# Patient Record
Sex: MALE | Race: WHITE | NOT HISPANIC OR LATINO
[De-identification: names, ages, dates, MRNs, and addresses within clinical notes are randomized per-mention and may not be internally consistent; named-entity substitution may affect disease eponyms.]

---

## 2021-11-19 PROBLEM — Z00.00 ENCOUNTER FOR PREVENTIVE HEALTH EXAMINATION: Status: ACTIVE | Noted: 2021-11-19

## 2021-11-22 ENCOUNTER — APPOINTMENT (OUTPATIENT)
Dept: RADIATION ONCOLOGY | Facility: CLINIC | Age: 71
End: 2021-11-22
Payer: MEDICARE

## 2021-11-22 VITALS
SYSTOLIC BLOOD PRESSURE: 116 MMHG | OXYGEN SATURATION: 98 % | RESPIRATION RATE: 18 BRPM | TEMPERATURE: 98 F | DIASTOLIC BLOOD PRESSURE: 70 MMHG | HEART RATE: 84 BPM

## 2021-11-22 DIAGNOSIS — Z87.39 PERSONAL HISTORY OF OTHER DISEASES OF THE MUSCULOSKELETAL SYSTEM AND CONNECTIVE TISSUE: ICD-10-CM

## 2021-11-22 DIAGNOSIS — G20 PARKINSON'S DISEASE: ICD-10-CM

## 2021-11-22 DIAGNOSIS — Z80.51 FAMILY HISTORY OF MALIGNANT NEOPLASM OF KIDNEY: ICD-10-CM

## 2021-11-22 DIAGNOSIS — Z86.73 PERSONAL HISTORY OF TRANSIENT ISCHEMIC ATTACK (TIA), AND CEREBRAL INFARCTION W/OUT RESIDUAL DEFICITS: ICD-10-CM

## 2021-11-22 DIAGNOSIS — Z87.891 PERSONAL HISTORY OF NICOTINE DEPENDENCE: ICD-10-CM

## 2021-11-22 PROCEDURE — 99204 OFFICE O/P NEW MOD 45 MIN: CPT | Mod: 25

## 2021-11-22 RX ORDER — ASPIRIN 81 MG
81 TABLET, DELAYED RELEASE (ENTERIC COATED) ORAL
Refills: 0 | Status: ACTIVE | COMMUNITY

## 2021-11-22 RX ORDER — CARBIDOPA AND LEVODOPA 25; 250 MG/1; MG/1
25-250 TABLET ORAL
Refills: 0 | Status: ACTIVE | COMMUNITY

## 2021-11-22 RX ORDER — ADHESIVE TAPE 3"X 2.3 YD
50 MCG TAPE, NON-MEDICATED TOPICAL
Refills: 0 | Status: ACTIVE | COMMUNITY

## 2021-11-22 RX ORDER — ALLOPURINOL 100 MG/1
100 TABLET ORAL
Refills: 0 | Status: ACTIVE | COMMUNITY

## 2021-11-22 RX ORDER — PRAMIPEXOLE DIHYDROCHLORIDE 0.5 MG/1
0.5 TABLET ORAL
Refills: 0 | Status: ACTIVE | COMMUNITY

## 2021-11-22 RX ORDER — ESCITALOPRAM OXALATE 10 MG/1
10 TABLET ORAL
Refills: 0 | Status: ACTIVE | COMMUNITY

## 2021-11-22 RX ORDER — METOPROLOL TARTRATE 50 MG/1
50 TABLET, FILM COATED ORAL
Refills: 0 | Status: ACTIVE | COMMUNITY

## 2021-11-24 NOTE — HISTORY OF PRESENT ILLNESS
[FreeTextEntry1] : Mr. 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 \par \par Mr. Lemos underwent routine lab work that demonstrated a PSA of 8.6 ng/ml. He was referred to Dr. Wright ([facility]) and underwent further work-up. TRUS guided biopsy was performed on 9/23/21 and pathology revealed prostatic adenocarcinoma, [place] 3+4+7 disease, with 7/8 cores were positive bilaterally. Specifically, the right mid core biopsy was negative. \par \par CT  waw IC (10/1/21) revealed moderate to severe cystitis, an atrophic kidney and a small sclerotic focus in L1. Bone scan was performed (10/1/21) revealing no bony metastatic disease. Mr. Lemos reports he had a radiation consultation for CyberKnife performed by Dr. Villavicencio and was deemed not to be an appropriate candidate. He was seen in consultation by Dr. Sheehan for a second opinion . He was started on ADT on 10/7/21 and has follow-up scheduled with Dr. Sheehan in January. \par \par Of note, he has a history of Parkinson's disease fpr the past 18 years and is under the care of Dr. Johnson ([facility]). As a result of the Parkinsons, he has a suprapubic catheter.  He is a past smoker for 17 years 1 ppd and quit 33 years ago. He has a family history inclusive of his brother, niece and nephew, hereditary kidney cancer. Mr. Lemos and his wife present today for a consultation to discuss radiation therapy options.

## 2021-11-24 NOTE — VITALS
[Maximal Pain Intensity: 0/10] : 0/10 [Least Pain Intensity: 0/10] : 0/10 [80: Normal activity with effort; some signs or symptoms of disease.] : 80: Normal activity with effort; some signs or symptoms of disease.  [Date: ____________] : Patient's last distress assessment performed on [unfilled]. [0 - No Distress] : Distress Level: 0 [Referred Patient  to social work for follow-up] : Patient was referred to social work for follow-up [FreeTextEntry7] : Transportation

## 2021-11-24 NOTE — LETTER CLOSING
[Consult Closing:] : Thank you for allowing me to participate in the care of this patient.  If you have any questions, please do not hesitate to contact me. [Sincerely yours,] : Sincerely yours, [FreeTextEntry3] : Kecia Payton MD\par

## 2021-11-24 NOTE — DISEASE MANAGEMENT
[Clinical] : TNM Stage: c [IIA] : IIA [FreeTextEntry4] : Adenocarcinoma of the prostate, PSA 8.6ng/ml, GS 3+4 [TTNM] : 1c [NTNM] : 0 [MTNM] : 0 [de-identified] : prostate

## 2021-11-29 ENCOUNTER — RESULT REVIEW (OUTPATIENT)
Age: 71
End: 2021-11-29

## 2021-12-06 ENCOUNTER — NON-APPOINTMENT (OUTPATIENT)
Age: 71
End: 2021-12-06

## 2021-12-13 ENCOUNTER — NON-APPOINTMENT (OUTPATIENT)
Age: 71
End: 2021-12-13

## 2021-12-21 ENCOUNTER — NON-APPOINTMENT (OUTPATIENT)
Age: 71
End: 2021-12-21

## 2021-12-23 NOTE — DISEASE MANAGEMENT
[Clinical] : TNM Stage: c [IIA] : IIA [FreeTextEntry4] : Adenocarcinoma of the prostate, PSA 8.6ng/ml, GS 3+4 [TTNM] : 1c [NTNM] : 0 [MTNM] : 0 [de-identified] : 750 cGy [de-identified] : 7000 cGy [de-identified] : prostate

## 2021-12-23 NOTE — HISTORY OF PRESENT ILLNESS
[FreeTextEntry1] : Mr. Lemos is present for OTV today. He offer no new symptoms or concerns at this time. He reports he is beginning to be able to feel the urge of urination but has suprapubic catheter in place. He reports he is tolerating treatment well.

## 2021-12-23 NOTE — PHYSICAL EXAM
[Normal] : well developed, well nourished, in no acute distress [de-identified] : suprapubic catheter present

## 2021-12-28 ENCOUNTER — RESULT REVIEW (OUTPATIENT)
Age: 71
End: 2021-12-28

## 2021-12-28 ENCOUNTER — NON-APPOINTMENT (OUTPATIENT)
Age: 71
End: 2021-12-28

## 2021-12-28 DIAGNOSIS — N39.0 INFECTION AND INFLAMMATORY REACTION DUE TO NEPHROSTOMY CATHETER, SUBSEQUENT ENCOUNTER: ICD-10-CM

## 2021-12-28 DIAGNOSIS — T83.512D INFECTION AND INFLAMMATORY REACTION DUE TO NEPHROSTOMY CATHETER, SUBSEQUENT ENCOUNTER: ICD-10-CM

## 2021-12-28 RX ORDER — SULFAMETHOXAZOLE AND TRIMETHOPRIM 800; 160 MG/1; MG/1
800-160 TABLET ORAL TWICE DAILY
Qty: 6 | Refills: 2 | Status: ACTIVE | COMMUNITY
Start: 2021-12-28 | End: 1900-01-01

## 2021-12-29 NOTE — DISEASE MANAGEMENT
[Clinical] : TNM Stage: c [IIA] : IIA [FreeTextEntry4] : Adenocarcinoma of the prostate, PSA 8.6ng/ml, GS 3+4 [TTNM] : 1c [NTNM] : 0 [MTNM] : 0 [de-identified] : 1750 cGy [de-identified] : 7000 cGy [de-identified] : prostate

## 2021-12-29 NOTE — PHYSICAL EXAM
[Normal] : oriented to person, place and time, the affect was normal, the mood was normal and not anxious [de-identified] : suprapubic catheter present

## 2021-12-29 NOTE — HISTORY OF PRESENT ILLNESS
[FreeTextEntry1] : Mr. Lemos is status post fraction 7 / 28 of radiation to the prostate/SV/nodes. Reports some thick mucous from suprapubic catheter, but cleared up when increased water intake. Sending him for u/a, c&s.  States having catheter changed next week. He reports an increase in fatigue, and is finding he has to take a nap in the afternoons.

## 2021-12-29 NOTE — ADDENDUM
[FreeTextEntry1] : UA C&S demonstrated a UTI, patient was notified of the results and started on DS Bactrim.

## 2022-01-04 ENCOUNTER — NON-APPOINTMENT (OUTPATIENT)
Age: 72
End: 2022-01-04

## 2022-01-04 VITALS
HEART RATE: 91 BPM | RESPIRATION RATE: 18 BRPM | DIASTOLIC BLOOD PRESSURE: 74 MMHG | SYSTOLIC BLOOD PRESSURE: 104 MMHG | OXYGEN SATURATION: 95 %

## 2022-01-05 NOTE — HISTORY OF PRESENT ILLNESS
[FreeTextEntry1] : Mr. Lemos is present for OTV. Diagnosed with a UTI last week, Mr. Lemos completed his course of antibiotics. He denies any new urinary or bowel symptoms at this time. He is tolerating radiation treatment well. He reports he had a quiet but nice holiday season. Overall, doing well.

## 2022-01-05 NOTE — DISEASE MANAGEMENT
[Clinical] : TNM Stage: c [IIA] : IIA [FreeTextEntry4] : Adenocarcinoma of the prostate, PSA 8.6ng/ml, GS 3+4 [TTNM] : 1c [NTNM] : 0 [MTNM] : 0 [de-identified] : 8800 cGy [de-identified] : 7000 cGy [de-identified] : prostate

## 2022-01-05 NOTE — PHYSICAL EXAM
[Normal] : oriented to person, place and time, the affect was normal, the mood was normal and not anxious [de-identified] : suprapubic catheter present

## 2022-01-10 VITALS
HEART RATE: 99 BPM | DIASTOLIC BLOOD PRESSURE: 80 MMHG | SYSTOLIC BLOOD PRESSURE: 148 MMHG | OXYGEN SATURATION: 97 % | RESPIRATION RATE: 18 BRPM | TEMPERATURE: 98 F

## 2022-01-10 NOTE — HISTORY OF PRESENT ILLNESS
[FreeTextEntry1] : Mr. Lemos is present for OTV. He is status post fraction 14 / 28 of radiation to the prostate/SV. He offers complaints of foul smelling urine from his catheter. Patient recently completed a course of DS Bactrim for a UTI.  UA/CNS ordered. He has follow-up with Dr. Sheehan on Thursday. Otherwise, no new symptoms.

## 2022-01-10 NOTE — DISEASE MANAGEMENT
[Clinical] : TNM Stage: c [IIA] : IIA [FreeTextEntry4] : Adenocarcinoma of the prostate, PSA 8.6ng/ml, GS 3+4 [TTNM] : 1c [NTNM] : 0 [MTNM] : 0 [de-identified] : 3500 cGy [de-identified] : prostate  [de-identified] : 7000 cGy

## 2022-01-10 NOTE — PHYSICAL EXAM
[Normal] : oriented to person, place and time, the affect was normal, the mood was normal and not anxious [de-identified] : suprapubic catheter present

## 2022-01-10 NOTE — REVIEW OF SYSTEMS
[Urinary Incontinence: Grade 0] : Urinary Incontinence: Grade 0  [Urinary Retention: Grade 0] : Urinary Retention: Grade 0 [Urinary Tract Pain: Grade 0] : Urinary Tract Pain: Grade 0 [Urinary Urgency: Grade 0] : Urinary Urgency: Grade 0 [Urinary Frequency: Grade 0] : Urinary Frequency: Grade 0

## 2022-01-19 ENCOUNTER — NON-APPOINTMENT (OUTPATIENT)
Age: 72
End: 2022-01-19

## 2022-01-19 VITALS
HEART RATE: 87 BPM | OXYGEN SATURATION: 95 % | TEMPERATURE: 98.6 F | RESPIRATION RATE: 18 BRPM | DIASTOLIC BLOOD PRESSURE: 76 MMHG | SYSTOLIC BLOOD PRESSURE: 110 MMHG

## 2022-01-21 NOTE — DISEASE MANAGEMENT
[Clinical] : TNM Stage: c [IIA] : IIA [FreeTextEntry4] : Adenocarcinoma of the prostate, PSA 8.6ng/ml, GS 3+4 [TTNM] : 1c [NTNM] : 0 [MTNM] : 0 [de-identified] : 5250 cGy [de-identified] : 7000 cGy [de-identified] : prostate

## 2022-01-21 NOTE — REVIEW OF SYSTEMS
[Fatigue: Grade 1 - Fatigue relieved by rest] : Fatigue: Grade 1 - Fatigue relieved by rest [Urinary Frequency: Grade 1 - Present] : Urinary Frequency: Grade 1 - Present

## 2022-01-21 NOTE — HISTORY OF PRESENT ILLNESS
[FreeTextEntry1] : Mr. Lemos is status post fraction 21 / 28 of radiation to the prostate/SV. He completed a course of DS Bactrim for a UTI in December. Recently with complaints of foul smelling / cloudy urine and recent UA/CNS 1/14/22 was negative. He is otherwise tolerating treatment well. He denies any bowel symptoms at this time. He reports grade 1 fatigue, relieved by rest. \par

## 2022-01-25 ENCOUNTER — NON-APPOINTMENT (OUTPATIENT)
Age: 72
End: 2022-01-25

## 2022-01-25 VITALS
OXYGEN SATURATION: 96 % | HEART RATE: 88 BPM | RESPIRATION RATE: 18 BRPM | SYSTOLIC BLOOD PRESSURE: 130 MMHG | DIASTOLIC BLOOD PRESSURE: 63 MMHG

## 2022-01-26 NOTE — DISEASE MANAGEMENT
[Clinical] : TNM Stage: c [IIA] : IIA [FreeTextEntry4] : Adenocarcinoma of the prostate, PSA 8.6ng/ml, GS 3+4 [TTNM] : 1c [NTNM] : 0 [MTNM] : 0 [de-identified] : 0360 cGy [de-identified] : 7000 cGy [de-identified] : prostate

## 2022-01-26 NOTE — HISTORY OF PRESENT ILLNESS
[FreeTextEntry1] : Mr. Lemos is present for OTV and is status post fraction 25 / 28 of radiation. He reports improved urinary symptoms. He denies any bowel changes. He has grade 1 fatigue relieved by rest. He is scheduled to complete radiation on Friday. Overall, tolerating treatment well.

## 2022-02-23 ENCOUNTER — APPOINTMENT (OUTPATIENT)
Dept: RADIATION ONCOLOGY | Facility: CLINIC | Age: 72
End: 2022-02-23

## 2022-03-09 ENCOUNTER — APPOINTMENT (OUTPATIENT)
Dept: RADIATION ONCOLOGY | Facility: CLINIC | Age: 72
End: 2022-03-09

## 2022-03-09 VITALS
OXYGEN SATURATION: 98 % | SYSTOLIC BLOOD PRESSURE: 99 MMHG | DIASTOLIC BLOOD PRESSURE: 55 MMHG | HEART RATE: 88 BPM | RESPIRATION RATE: 18 BRPM

## 2022-03-09 PROCEDURE — 99024 POSTOP FOLLOW-UP VISIT: CPT

## 2022-03-09 NOTE — HISTORY OF PRESENT ILLNESS
[FreeTextEntry1] : Mr. Lemos is a 71 year old male, diagnosed with Stage IIA, [id number], unfavorable intermediate risk adenocarcinoma of the prostate, s/p adjuvant radiation therapy to the prostate. He is present for his post treatment evaluation. \par \par Mr. Lemos underwent routine lab work that demonstrated a PSA of 8.6 ng/ml. He was referred to Dr. Wright ([facility]) and underwent further work-up. TRUS guided biopsy was performed on 9/23/21 and pathology revealed prostatic adenocarcinoma, [place] 3+4+7 disease, with 7/8 cores were positive bilaterally. Specifically, the right mid core biopsy was negative. \par \par CT  waw IC (10/1/21) revealed moderate to severe cystitis, an atrophic kidney and a small sclerotic focus in L1. Bone scan was performed (10/1/21) revealing no bony metastatic disease. Mr. Lemos reports he had a radiation consultation for CyberKnife performed by Dr. Villavicencio and was deemed not to be an appropriate candidate. He was seen in consultation by Dr. Sheehan for a second opinion . He was started on ADT on 10/7/21. \par \par Of note, he has a history of Parkinson's disease for the past 18 years and is under the care of Dr. Johnson ([facility]). As a result of the Parkinsons, he has a suprapubic catheter. \par \par Mr. Lemos is present today with his wife. He received adjuvant radiation therapy to the prostate to 70 Gy, completed on 1/28/22. He tolerated treatment well with 1 episode of UTI, while on treatment. He reports cloudy, foul smelling urine today time 1 week. e reports he was doing self catherization since tube seemed to be clogged. He had his suprapubic catheter replaced in [place] [facility] in February after dislodgment at home. He reports visiting RN changes and flushes tube once per month. He denies any bowel changes at this time, however suffers with constipation and is on Miralax. He reports worsening involuntary movements related to his Parkinson's since completing radiation therapy. He had follow-up with Dr. Johnson, who he stated it should improve with time. He is scheduled for follow-up with Dr. Sheehan in April. Mr. Lemos is present today for his post treatment evaluation.

## 2022-03-09 NOTE — REVIEW OF SYSTEMS
[Negative] : Heme/Lymph [Skin Hyperpigmentation: Grade 0] : Skin Hyperpigmentation: Grade 0 [Dermatitis Radiation: Grade 0] : Dermatitis Radiation: Grade 0 [FreeTextEntry8] : "cloudy, foul smelling urine"; suprapubic catheter in place [FreeTextEntry4] : foul smelling, cloudy

## 2022-03-09 NOTE — PHYSICAL EXAM
[Normal] : the sclera and conjunctiva were normal, pupils were equal in size, round, reactive to light and extraocular movements were intact. [de-identified] : s [FreeTextEntry1] : deferred [de-identified] : suprapubic catheter present

## 2022-03-09 NOTE — DISEASE MANAGEMENT
[Clinical] : TNM Stage: c [IIA] : IIA [FreeTextEntry4] : Adenocarcinoma of the prostate, PSA 8.6ng/ml, GS 3+4 [TTNM] : 1c [NTNM] : 0 [MTNM] : 0 [de-identified] : prostate

## 2022-06-23 ENCOUNTER — APPOINTMENT (OUTPATIENT)
Dept: RADIATION ONCOLOGY | Facility: CLINIC | Age: 72
End: 2022-06-23
Payer: MEDICARE

## 2022-06-23 VITALS
HEART RATE: 68 BPM | HEIGHT: 68 IN | OXYGEN SATURATION: 98 % | SYSTOLIC BLOOD PRESSURE: 116 MMHG | TEMPERATURE: 97.1 F | RESPIRATION RATE: 18 BRPM | DIASTOLIC BLOOD PRESSURE: 68 MMHG

## 2022-06-23 PROCEDURE — 99214 OFFICE O/P EST MOD 30 MIN: CPT

## 2022-06-23 RX ORDER — CEFUROXIME AXETIL 250 MG/1
250 TABLET ORAL
Qty: 14 | Refills: 0 | Status: ACTIVE | COMMUNITY
Start: 2022-02-06

## 2022-06-23 RX ORDER — METOPROLOL SUCCINATE 50 MG/1
50 TABLET, EXTENDED RELEASE ORAL
Qty: 90 | Refills: 0 | Status: ACTIVE | COMMUNITY
Start: 2022-01-10

## 2022-06-23 RX ORDER — FAMOTIDINE 40 MG/1
40 TABLET, FILM COATED ORAL
Qty: 30 | Refills: 0 | Status: ACTIVE | COMMUNITY
Start: 2022-03-13

## 2022-06-23 RX ORDER — COLCHICINE 0.6 MG/1
0.6 TABLET ORAL
Qty: 4 | Refills: 0 | Status: ACTIVE | COMMUNITY
Start: 2022-02-24

## 2022-06-23 RX ORDER — NYSTATIN 100000 [USP'U]/G
100000 POWDER TOPICAL
Qty: 30 | Refills: 0 | Status: ACTIVE | COMMUNITY
Start: 2022-01-13

## 2022-06-23 RX ORDER — NITROFURANTOIN (MONOHYDRATE/MACROCRYSTALS) 25; 75 MG/1; MG/1
100 CAPSULE ORAL
Qty: 60 | Refills: 0 | Status: ACTIVE | COMMUNITY
Start: 2022-03-24

## 2022-07-05 ENCOUNTER — NON-APPOINTMENT (OUTPATIENT)
Age: 72
End: 2022-07-05

## 2022-10-20 ENCOUNTER — APPOINTMENT (OUTPATIENT)
Dept: RADIATION ONCOLOGY | Facility: CLINIC | Age: 72
End: 2022-10-20

## 2022-10-20 PROCEDURE — 99214 OFFICE O/P EST MOD 30 MIN: CPT

## 2022-10-25 NOTE — HISTORY OF PRESENT ILLNESS
[FreeTextEntry1] : Mr. Lemos is a 71 year old male, diagnosed with Stage IIA, [id number], unfavorable intermediate risk adenocarcinoma of the prostate, s/p  radiation therapy to the prostate. He is present for his post treatment evaluation. \par \par Mr. Lemos underwent routine lab work that demonstrated a PSA of 8.6 ng/ml. He was referred to Dr. Wright ([facility]) and underwent further work-up. TRUS guided biopsy was performed on 9/23/21 and pathology revealed prostatic adenocarcinoma, [place] 3+4+7 disease, with 7/8 cores were positive bilaterally. Specifically, the right mid core biopsy was negative. \par \par CT  waw IC (10/1/21) revealed moderate to severe cystitis, an atrophic kidney and a small sclerotic focus in L1. Bone scan was performed (10/1/21) revealing no bony metastatic disease.  He was started on ADT on 10/7/21. \par \par Of note, he has a history of Parkinson's disease for the past 18 years and is under the care of Dr. Johnson ([facility]). As a result of the Parkinsons, he has a suprapubic catheter. \par \par Mr. Lemos received definitive radiation therapy to the prostate to 70 Gy, completed on 1/28/22. He reports no new urinary and bowel symptoms. He had follow-up with Dr. Sheehan in April and received his last hormone shot at that time. PSA 0.32 ng/ml (3/9/22). Mr. Lemos presents today for routine follow-up, he is doing well.

## 2022-10-25 NOTE — DISEASE MANAGEMENT
[Clinical] : TNM Stage: c [IIA] : IIA [FreeTextEntry4] : Adenocarcinoma of the prostate, PSA 8.6ng/ml, GS 3+4 [TTNM] : 1c [NTNM] : 0 [MTNM] : 0 [de-identified] : prostate

## 2022-10-25 NOTE — HISTORY OF PRESENT ILLNESS
[FreeTextEntry1] : Mr. Lemos is a 72 year old male, diagnosed with Stage IIA, [id number], unfavorable intermediate risk adenocarcinoma of the prostate, s/p radiation therapy to the prostate. \par \par Mr. Lemos underwent routine lab work that demonstrated a PSA of 8.6 ng/ml. He was referred to Dr. Wright ([facility]) and underwent further work-up. TRUS guided biopsy was performed on 9/23/21 and pathology revealed prostatic adenocarcinoma, [place] 3+4+7 disease, with 7/8 cores were positive bilaterally. Specifically, the right mid core biopsy was negative. \par \par CT  waw IC (10/1/21) revealed moderate to severe cystitis, an atrophic kidney and a small sclerotic focus in L1. Bone scan was performed (10/1/21) revealing no bony metastatic disease. He was started on ADT on 10/7/21. \par \par Of note, Mr. Lemos has a history of Parkinson's disease for the past 18 years and is under the care of Dr. Johnson ([facility]). As a result of the Parkinsons, he has a suprapubic catheter. \par \par Mr. Lemos received definitive radiation therapy to the prostate to 70 Gy, completed on 1/28/22. He had follow-up with Dr. Sheehan in April and received his last hormone shot. He reports no issues with his suprapubic catheter and urination. \par \par PSA \par 0.12 ng/ml (6/23/22) \par 0.32 ng/ml (3/9/22).

## 2022-10-25 NOTE — PHYSICAL EXAM
[Normal] : normal heart rate and rhythm, normal S1 and S2, and no murmurs present [FreeTextEntry1] : deferred [de-identified] : suprapubic catheter present

## 2022-10-25 NOTE — DISEASE MANAGEMENT
[Clinical] : TNM Stage: c [IIA] : IIA [FreeTextEntry4] : Adenocarcinoma of the prostate, PSA 8.6ng/ml, GS 3+4 [TTNM] : 1c [NTNM] : 0 [MTNM] : 0 [de-identified] : prostate

## 2022-10-25 NOTE — PHYSICAL EXAM
[Normal] : oriented to person, place and time, the affect was normal, the mood was normal and not anxious [FreeTextEntry1] : deferred [de-identified] : suprapubic catheter present

## 2022-10-27 ENCOUNTER — NON-APPOINTMENT (OUTPATIENT)
Age: 72
End: 2022-10-27

## 2023-02-16 ENCOUNTER — APPOINTMENT (OUTPATIENT)
Dept: RADIATION ONCOLOGY | Facility: CLINIC | Age: 73
End: 2023-02-16
Payer: MEDICARE

## 2023-02-16 PROCEDURE — 99214 OFFICE O/P EST MOD 30 MIN: CPT

## 2023-02-20 NOTE — HISTORY OF PRESENT ILLNESS
[FreeTextEntry1] : Mr. Lemos is a 72 year old male, diagnosed with Stage IIA, [id number], unfavorable intermediate risk adenocarcinoma of the prostate, s/p radiation therapy to the prostate. \par \par Mr. Lemos underwent routine lab work that demonstrated a PSA of 8.6 ng/ml. He was referred to Dr. Wright ([facility]) and underwent further work-up. TRUS guided biopsy was performed on 9/23/21 and pathology revealed prostatic adenocarcinoma, [place] 3+4+7 disease, with 7/8 cores were positive bilaterally. Specifically, the right mid core biopsy was negative. \par \par CT  waw IC (10/1/21) revealed moderate to severe cystitis, an atrophic kidney and a small sclerotic focus in L1. Bone scan was performed (10/1/21) revealing no bony metastatic disease. He was started on ADT on 10/7/21. \par \par Of note, Mr. Lemos has a history of Parkinson's disease for the past 18 years and is under the care of Dr. Johnson ([facility]). As a result of the Parkinsons, he has a suprapubic catheter. \par \par Mr. Lemos received definitive radiation therapy to the prostate to 70 Gy, completed on 1/28/22. He had follow-up with Dr. Sheehan in December. He completed ADT in 2022. He reports no new symptoms with his urination/suprapubic catheter. He denies any bowel symptoms at this time. He is doing well overall. \par \par PSA \par 0.06 ng/ml (12/2022)\par 0.07 ng/ml (10/20/22)\par 0.12 ng/ml (6/23/22) \par 0.32 ng/ml (3/9/22)

## 2023-02-20 NOTE — DISEASE MANAGEMENT
[Clinical] : TNM Stage: c [IIA] : IIA [FreeTextEntry4] : Adenocarcinoma of the prostate, PSA 8.6ng/ml, GS 3+4 [TTNM] : 1c [NTNM] : 0 [MTNM] : 0 [de-identified] : prostate

## 2023-02-20 NOTE — PHYSICAL EXAM
[Normal] : oriented to person, place and time, the affect was normal, the mood was normal and not anxious [FreeTextEntry1] : deferred [de-identified] : suprapubic catheter present

## 2023-08-02 ENCOUNTER — NON-APPOINTMENT (OUTPATIENT)
Age: 73
End: 2023-08-02

## 2023-08-24 ENCOUNTER — APPOINTMENT (OUTPATIENT)
Dept: RADIATION ONCOLOGY | Facility: CLINIC | Age: 73
End: 2023-08-24
Payer: MEDICARE

## 2023-08-24 VITALS
SYSTOLIC BLOOD PRESSURE: 133 MMHG | HEART RATE: 93 BPM | RESPIRATION RATE: 18 BRPM | OXYGEN SATURATION: 96 % | DIASTOLIC BLOOD PRESSURE: 73 MMHG

## 2023-08-24 PROCEDURE — 99214 OFFICE O/P EST MOD 30 MIN: CPT

## 2023-08-29 NOTE — REVIEW OF SYSTEMS
[Negative] : Integumentary [Constipation: Grade 0] : Constipation: Grade 0 [Diarrhea: Grade 0] : Diarrhea: Grade 0 [Proctitis: Grade 0] : Proctitis: Grade 0 [Rectal Pain: Grade 0] : Rectal Pain: Grade 0 [Fatigue: Grade 0] : Fatigue: Grade 0 [Hematuria: Grade 0] : Hematuria: Grade 0 [Urinary Incontinence: Grade 0] : Urinary Incontinence: Grade 0  [Urinary Retention: Grade 0] : Urinary Retention: Grade 0 [Urinary Tract Pain: Grade 0] : Urinary Tract Pain: Grade 0 [Urinary Urgency: Grade 0] : Urinary Urgency: Grade 0 [Urinary Frequency: Grade 0] : Urinary Frequency: Grade 0 [Alopecia: Grade 0] : Alopecia: Grade 0 [Pruritus: Grade 0] : Pruritus: Grade 0 [Skin Atrophy: Grade 0] : Skin Atrophy: Grade 0 [Skin Hyperpigmentation: Grade 0] : Skin Hyperpigmentation: Grade 0 [Skin Induration: Grade 0] : Skin Induration: Grade 0 [Dermatitis Radiation: Grade 0] : Dermatitis Radiation: Grade 0 [Chest Pain] : no chest pain [Shortness Of Breath] : no shortness of breath [Cough] : no cough [FreeTextEntry8] : + suprapubic catheter

## 2023-08-29 NOTE — HISTORY OF PRESENT ILLNESS
[FreeTextEntry1] : Mr. Lemos is a 73-year-old male diagnosed with Stage IIA [id number], unfavorable intermediate risk adenocarcinoma of the prostate s/p radiation therapy to the prostate.   Mr. Lemos had routine bloodwork done that resulted with a PSA level of 8.6 ng/ml and was referred to Dr. Wright for further work-up.  9/23/21 TRUS guided-biopsy: Prostatic adenocarcinoma GS 3+4=7, with 7/8 cores positive bilaterally. The right mid core biopsy was negative.  10/1/21 CT  w/ contrast: moderate-severe cystitis of indeterminate chronicity. Small sclerotic focus in the L1 vertebral body probably representing a benign bone island. chronic appearing L1 compression fracture.  10/1/21 NM Bone Scan: No evidence of bony metastatic disease. He was started on ADT on 10/7/21.   Mr. Lemos completed IMRT in 28 fractions to the prostate to a total dose of 7000 cGy on 1/28/22.   3/9/22 PSA 0.32 ng/ml 6/23/22 PSA 0.12 ng/ml 10/20/22 PSA 0.07 ng/ml 12/2022 PSA 0.06 ng/ml 8/16/23 PSA 0.09 ng/ml  8/24/23 Mr. Lemos presents today for routine follow up. He states he feels fine overall. Mr. Lemos has a suprapubic catheter in place, and therefore, denies any urinary symptoms. He was recently hospitalized at an outside facility for urinary sepsis, official reports unavailable at this time. He continues to follow with Dr. Wright at [facility] and has his next appointment 9/15/23.

## 2023-08-29 NOTE — PHYSICAL EXAM
[Normal] : oriented to person, place and time, the affect was normal, the mood was normal and not anxious [FreeTextEntry1] : deferred

## 2024-03-21 ENCOUNTER — APPOINTMENT (OUTPATIENT)
Dept: RADIATION ONCOLOGY | Facility: CLINIC | Age: 74
End: 2024-03-21

## 2024-03-28 ENCOUNTER — APPOINTMENT (OUTPATIENT)
Dept: RADIATION ONCOLOGY | Facility: CLINIC | Age: 74
End: 2024-03-28
Payer: MEDICARE

## 2024-03-28 VITALS
RESPIRATION RATE: 18 BRPM | HEIGHT: 68 IN | DIASTOLIC BLOOD PRESSURE: 71 MMHG | HEART RATE: 82 BPM | OXYGEN SATURATION: 95 % | SYSTOLIC BLOOD PRESSURE: 118 MMHG

## 2024-03-28 DIAGNOSIS — C61 MALIGNANT NEOPLASM OF PROSTATE: ICD-10-CM

## 2024-03-28 PROCEDURE — 99214 OFFICE O/P EST MOD 30 MIN: CPT

## 2024-04-02 NOTE — REVIEW OF SYSTEMS
[Negative] : Integumentary [Constipation: Grade 0] : Constipation: Grade 0 [Diarrhea: Grade 0] : Diarrhea: Grade 0 [Proctitis: Grade 0] : Proctitis: Grade 0 [Rectal Pain: Grade 0] : Rectal Pain: Grade 0 [Fatigue: Grade 0] : Fatigue: Grade 0 [Hematuria: Grade 0] : Hematuria: Grade 0 [Urinary Retention: Grade 0] : Urinary Retention: Grade 0 [Urinary Incontinence: Grade 0] : Urinary Incontinence: Grade 0  [Urinary Tract Pain: Grade 0] : Urinary Tract Pain: Grade 0 [Urinary Frequency: Grade 0] : Urinary Frequency: Grade 0 [Urinary Urgency: Grade 0] : Urinary Urgency: Grade 0 [Alopecia: Grade 0] : Alopecia: Grade 0 [Skin Atrophy: Grade 0] : Skin Atrophy: Grade 0 [Pruritus: Grade 0] : Pruritus: Grade 0 [Skin Hyperpigmentation: Grade 0] : Skin Hyperpigmentation: Grade 0 [Skin Induration: Grade 0] : Skin Induration: Grade 0 [Dermatitis Radiation: Grade 0] : Dermatitis Radiation: Grade 0 [Chest Pain] : no chest pain [Shortness Of Breath] : no shortness of breath [Cough] : no cough [FreeTextEntry8] : + suprapubic catheter

## 2024-04-02 NOTE — HISTORY OF PRESENT ILLNESS
[FreeTextEntry1] : Mr. Lemos is a 73-year-old male diagnosed with Stage IIA [id number], unfavorable intermediate risk adenocarcinoma of the prostate s/p radiation therapy to the prostate.   Mr. Lemos had routine bloodwork done that resulted with a PSA level of 8.6 ng/ml and was referred to Dr. Wright for further work-up.  9/23/21 TRUS guided-biopsy: Prostatic adenocarcinoma [place] 3+4=7, with 7/8 cores positive bilaterally. The right mid core biopsy was negative.  10/1/21 CT  w/ contrast: moderate-severe cystitis of indeterminate chronicity. Small sclerotic focus in the L1 vertebral body probably representing a benign bone island. chronic appearing L1 compression fracture.  10/1/21 NM Bone Scan: No evidence of bony metastatic disease. He was started on ADT on 10/7/21.   Mr. Lemos was treated with definitive radiation plus ADT and completed IMRT  to the prostate to a total dose of 7000 cGy on 1/28/22.   3/9/22 PSA 0.32 ng/ml 6/23/22 PSA 0.12 ng/ml 10/20/22 PSA 0.07 ng/ml 12/2022 PSA 0.06 ng/ml 8/16/23 PSA 0.09 ng/ml 12/13/23 - 0.06 ng/ml  Mr. Lemos is present for his follow-up. He reports no urine changes. He continues to have the suprapubic catheter in place. PSA was 0.06 ng/ml 12/23. He had follow-up by Dr. Sheehan in December 2023. He reports changes to his Parkinson's medication in February due to an increase in tremors. he is otherwise doing well.

## 2024-10-09 ENCOUNTER — APPOINTMENT (OUTPATIENT)
Dept: RADIATION ONCOLOGY | Facility: CLINIC | Age: 74
End: 2024-10-09
Payer: MEDICARE

## 2024-10-09 VITALS
HEART RATE: 91 BPM | SYSTOLIC BLOOD PRESSURE: 135 MMHG | RESPIRATION RATE: 18 BRPM | DIASTOLIC BLOOD PRESSURE: 78 MMHG | OXYGEN SATURATION: 95 %

## 2024-10-09 DIAGNOSIS — C61 MALIGNANT NEOPLASM OF PROSTATE: ICD-10-CM

## 2024-10-09 PROCEDURE — 99214 OFFICE O/P EST MOD 30 MIN: CPT

## 2024-10-09 PROCEDURE — G2211 COMPLEX E/M VISIT ADD ON: CPT

## 2025-04-01 ENCOUNTER — NON-APPOINTMENT (OUTPATIENT)
Age: 75
End: 2025-04-01

## 2025-04-03 ENCOUNTER — APPOINTMENT (OUTPATIENT)
Dept: RADIATION ONCOLOGY | Facility: CLINIC | Age: 75
End: 2025-04-03
Payer: MEDICARE

## 2025-04-03 VITALS
OXYGEN SATURATION: 96 % | SYSTOLIC BLOOD PRESSURE: 126 MMHG | HEART RATE: 98 BPM | DIASTOLIC BLOOD PRESSURE: 75 MMHG | RESPIRATION RATE: 18 BRPM

## 2025-04-03 DIAGNOSIS — C61 MALIGNANT NEOPLASM OF PROSTATE: ICD-10-CM

## 2025-04-03 PROCEDURE — 99214 OFFICE O/P EST MOD 30 MIN: CPT

## 2025-04-03 PROCEDURE — G2211 COMPLEX E/M VISIT ADD ON: CPT

## 2025-09-10 ENCOUNTER — NON-APPOINTMENT (OUTPATIENT)
Age: 75
End: 2025-09-10